# Patient Record
Sex: FEMALE | Race: WHITE | NOT HISPANIC OR LATINO | Employment: FULL TIME | URBAN - METROPOLITAN AREA
[De-identification: names, ages, dates, MRNs, and addresses within clinical notes are randomized per-mention and may not be internally consistent; named-entity substitution may affect disease eponyms.]

---

## 2017-06-19 ENCOUNTER — OFFICE VISIT (OUTPATIENT)
Dept: OBSTETRICS AND GYNECOLOGY | Facility: CLINIC | Age: 27
End: 2017-06-19
Payer: COMMERCIAL

## 2017-06-19 VITALS
HEIGHT: 66 IN | BODY MASS INDEX: 26.93 KG/M2 | SYSTOLIC BLOOD PRESSURE: 109 MMHG | DIASTOLIC BLOOD PRESSURE: 72 MMHG | WEIGHT: 167.56 LBS

## 2017-06-19 DIAGNOSIS — Z30.011 ENCOUNTER FOR INITIAL PRESCRIPTION OF CONTRACEPTIVE PILLS: ICD-10-CM

## 2017-06-19 DIAGNOSIS — Z01.419 ROUTINE GYNECOLOGICAL EXAMINATION: Primary | ICD-10-CM

## 2017-06-19 PROCEDURE — 99999 PR PBB SHADOW E&M-EST. PATIENT-LVL III: CPT | Mod: PBBFAC,,, | Performed by: ADVANCED PRACTICE MIDWIFE

## 2017-06-19 PROCEDURE — 87591 N.GONORRHOEAE DNA AMP PROB: CPT

## 2017-06-19 PROCEDURE — 88175 CYTOPATH C/V AUTO FLUID REDO: CPT

## 2017-06-19 PROCEDURE — 99395 PREV VISIT EST AGE 18-39: CPT | Mod: S$GLB,,, | Performed by: ADVANCED PRACTICE MIDWIFE

## 2017-06-19 RX ORDER — NORETHINDRONE ACETATE AND ETHINYL ESTRADIOL .03; 1.5 MG/1; MG/1
1 TABLET ORAL DAILY
Qty: 30 EACH | Refills: 12 | Status: SHIPPED | OUTPATIENT
Start: 2017-06-19 | End: 2018-04-03 | Stop reason: SDUPTHER

## 2017-06-19 NOTE — PROGRESS NOTES
Subjective:       Beverley Gonzalez is a 27 y.o. female here for a routine exam.  Current complaints: none.  Personal health questionnaire reviewed: no.     Gynecologic History  Patient's last menstrual period was 2017.  Contraception: OCP (estrogen/progesterone)  Last Pap: . Results were: normal  Last mammogram: n/a.     Obstetric History  OB History    Para Term  AB Living   0 0 0 0 0 0   SAB TAB Ectopic Multiple Live Births   0 0 0 0                The following portions of the patient's history were reviewed and updated as appropriate: allergies, current medications, past family history, past medical history, past social history, past surgical history and problem list.    Review of Systems  Pertinent items are noted in HPI.      Objective:           General Appearance:    Alert, cooperative, no distress, appears stated age   Head:    Normocephalic, without obvious abnormality, atraumatic   Lungs:     Clear to auscultation bilaterally, respirations unlabored    Heart:    Regular rate and rhythm, S1 and S2 normal, no murmur   Breast Exam:    Skin soft and clear, no puckering noted. Nipples centrally placed without discharge or pain. No masses palpated in breast tissue or axilla. Educated on self breast exams.   Abdomen:     Soft, non-tender, bowel sounds active all four quadrants,     no masses, no organomegaly   Genitalia:   Vulva normal in appearance, no rash or lesions noted. Vagina moist, with adequate ruggae, pink in appearance, no tenderness or lesions noted, no discharge noted. Cervix closed, pink, without bleeding or discharge. Specimens collected.  Bimanual exam: no CMT, uterus palpated approx 4 wk size, ovaries not palpated. Good vaginal tone noted. Rectal exam deferred.     Extremities:   Extremities normal, atraumatic, no cyanosis or edema   Pulses:   2+ and symmetric all extremities   Skin:   Skin color, texture, turgor normal, no rashes or lesions         Assessment:       Healthy female exam.      Plan:      Education reviewed: safe sex/STD prevention and self breast exams.  Contraception: OCP (estrogen/progesterone).  Follow up in: 1 year.

## 2017-06-21 LAB
C TRACH DNA SPEC QL NAA+PROBE: NOT DETECTED
N GONORRHOEA DNA SPEC QL NAA+PROBE: NOT DETECTED

## 2017-06-23 ENCOUNTER — PATIENT MESSAGE (OUTPATIENT)
Dept: OBSTETRICS AND GYNECOLOGY | Facility: CLINIC | Age: 27
End: 2017-06-23

## 2018-04-03 DIAGNOSIS — Z30.011 ENCOUNTER FOR INITIAL PRESCRIPTION OF CONTRACEPTIVE PILLS: ICD-10-CM

## 2018-04-03 RX ORDER — NORETHINDRONE ACETATE AND ETHINYL ESTRADIOL .03; 1.5 MG/1; MG/1
1 TABLET ORAL DAILY
Qty: 30 EACH | Refills: 3 | Status: SHIPPED | OUTPATIENT
Start: 2018-04-03 | End: 2018-04-16 | Stop reason: SDUPTHER

## 2018-04-03 NOTE — TELEPHONE ENCOUNTER
Hi. Yes, I am out of birth control. I had a mail order delivery get lost in the mail and I think when I called to reorder, the refill counted against me. Either way, I am running out by mid-April, thus I either need the refill or an appt asap.    ----- Message -----   From: Vimal Carson   Sent: 3/28/2018  8:21 AM CDT   To: Beverley Gonzalez   Subject: RE: Appointment Request   Your annual is not due until after 6/20/18 so I have you scheduled for 6/21/18 @ 8 am with Dr De Leon. Are you out of birth control ? There was enough refills to last a year.      ----- Message -----      From: Beverley Gonzalez      Sent: 3/28/2018  7:51 AM CDT        To: Patient Appointment Schedule Request Mailing List   Subject: RE: Appointment Request      Please set me up with another doctor then. I still need an appointment and to renew my birth control.    ThanksBeverley.   ----- Message -----   From: Vimal Carson   Sent: 3/27/2018  7:40 AM CDT   To: Beverley Gonzalez   Subject: RE: Appointment Request   The midwives are not seeing any GYN patient's only OB's so unfortunately this appointment will need to be scheduled with one of the Doctor's or NP's.      ----- Message -----      From: Beverley Gonzalez      Sent: 3/26/2018 10:42 PM CDT        To: Patient Appointment Schedule Request Mailing List   Subject: Appointment Request      Appointment Request From: Beverley Gonzalez      With Provider: Bing Mccollum CNM [[facility]]      Would Accept With:Only the person I've selected      Preferred Date Range: From 4/10/2018 To 4/18/2018      Preferred Times: Any      Reason for visit: Request an Appt      Comments:   Looking to book my annual check up.   Prefer a late afternoon appointment.

## 2018-04-16 DIAGNOSIS — Z30.011 ENCOUNTER FOR INITIAL PRESCRIPTION OF CONTRACEPTIVE PILLS: ICD-10-CM

## 2018-04-17 RX ORDER — NORETHINDRONE ACETATE AND ETHINYL ESTRADIOL .03; 1.5 MG/1; MG/1
1 TABLET ORAL DAILY
Qty: 90 EACH | Refills: 0 | Status: SHIPPED | OUTPATIENT
Start: 2018-04-17 | End: 2018-06-21 | Stop reason: SDUPTHER

## 2018-06-20 ENCOUNTER — PATIENT MESSAGE (OUTPATIENT)
Dept: OBSTETRICS AND GYNECOLOGY | Facility: CLINIC | Age: 28
End: 2018-06-20

## 2018-06-21 ENCOUNTER — OFFICE VISIT (OUTPATIENT)
Dept: OBSTETRICS AND GYNECOLOGY | Facility: CLINIC | Age: 28
End: 2018-06-21
Payer: COMMERCIAL

## 2018-06-21 VITALS
DIASTOLIC BLOOD PRESSURE: 64 MMHG | HEIGHT: 66 IN | BODY MASS INDEX: 26.8 KG/M2 | WEIGHT: 166.75 LBS | SYSTOLIC BLOOD PRESSURE: 110 MMHG

## 2018-06-21 DIAGNOSIS — Z01.419 ENCOUNTER FOR GYNECOLOGICAL EXAMINATION WITHOUT ABNORMAL FINDING: Primary | ICD-10-CM

## 2018-06-21 DIAGNOSIS — Z30.41 ENCOUNTER FOR SURVEILLANCE OF CONTRACEPTIVE PILLS: ICD-10-CM

## 2018-06-21 PROCEDURE — 99395 PREV VISIT EST AGE 18-39: CPT | Mod: S$GLB,,, | Performed by: NURSE PRACTITIONER

## 2018-06-21 PROCEDURE — 88175 CYTOPATH C/V AUTO FLUID REDO: CPT

## 2018-06-21 PROCEDURE — 99999 PR PBB SHADOW E&M-EST. PATIENT-LVL III: CPT | Mod: PBBFAC,,, | Performed by: NURSE PRACTITIONER

## 2018-06-21 RX ORDER — NORETHINDRONE ACETATE AND ETHINYL ESTRADIOL .03; 1.5 MG/1; MG/1
1 TABLET ORAL DAILY
Qty: 90 EACH | Refills: 4 | Status: SHIPPED | OUTPATIENT
Start: 2018-06-21 | End: 2019-06-17 | Stop reason: SDUPTHER

## 2018-06-21 NOTE — PROGRESS NOTES
"CC: Well woman exam    Beverley Gonzalez is a 28 y.o. female  presents for well woman exam.  LMP: Patient's last menstrual period was 2018 (approximate)..  No issues, problems, or complaints.      Past Medical History:   Diagnosis Date    Abnormal Pap smear of vagina     repeat pap normal     History reviewed. No pertinent surgical history.  Social History     Social History    Marital status: Single     Spouse name: N/A    Number of children: N/A    Years of education: N/A     Occupational History    Not on file.     Social History Main Topics    Smoking status: Never Smoker    Smokeless tobacco: Never Used    Alcohol use Yes      Comment: socially    Drug use: No    Sexual activity: Yes     Partners: Male     Birth control/ protection: OCP     Other Topics Concern    Not on file     Social History Narrative    No narrative on file     Family History   Problem Relation Age of Onset    Cancer Paternal Grandmother     Alzheimer's disease Maternal Grandfather     No Known Problems Father     No Known Problems Mother     No Known Problems Sister     Breast cancer Neg Hx     Colon cancer Neg Hx     Ovarian cancer Neg Hx      OB History      Para Term  AB Living    0 0 0 0 0 0    SAB TAB Ectopic Multiple Live Births    0 0 0 0            /64   Ht 5' 6" (1.676 m)   Wt 75.7 kg (166 lb 12.5 oz)   LMP 2018 (Approximate)   BMI 26.92 kg/m²       ROS:  GENERAL: Denies weight gain or weight loss. Feeling well overall.   SKIN: Denies rash or lesions.   HEAD: Denies head injury or headache.   NODES: Denies enlarged lymph nodes.   CHEST: Denies chest pain or shortness of breath.   CARDIOVASCULAR: Denies palpitations or left sided chest pain.   ABDOMEN: No abdominal pain, constipation, diarrhea, nausea, vomiting or rectal bleeding.   URINARY: No frequency, dysuria, hematuria, or burning on urination.  REPRODUCTIVE: See HPI.   BREASTS: The patient performs breast " self-examination and denies pain, lumps, or nipple discharge.   HEMATOLOGIC: No easy bruisability or excessive bleeding.   MUSCULOSKELETAL: Denies joint pain or swelling.   NEUROLOGIC: Denies syncope or weakness.   PSYCHIATRIC: Denies depression, anxiety or mood swings.    PHYSICAL EXAM:  APPEARANCE: Well nourished, well developed, in no acute distress.  AFFECT: WNL, alert and oriented x 3  SKIN: No acne or hirsutism  NECK: Neck symmetric without masses or thyromegaly  NODES: No inguinal, cervical, axillary, or femoral lymph node enlargement  CHEST: Good respiratory effect  ABDOMEN: Soft.  No tenderness or masses.  No hepatosplenomegaly.  No hernias.  BREASTS: Symmetrical, no skin changes or visible lesions.  No palpable masses, nipple discharge bilaterally.  PELVIC: Normal external genitalia without lesions.  Normal hair distribution.  Adequate perineal body, normal urethral meatus.  Vagina moist and well rugated without lesions or discharge.  Cervix pink - mucous at endocervix pt ending her cycle , without lesions, discharge or tenderness.  No significant cystocele or rectocele.  Bimanual exam shows uterus to be normal size, regular, mobile and nontender.  Adnexa without masses or tenderness.    EXTREMITIES: No edema.  Physical Exam    1. Encounter for gynecological examination without abnormal finding  Liquid-based pap smear, screening   2. Encounter for surveillance of contraceptive pills  norethindrone ac-eth estradiol (LOESTRIN 1.5/30, 21,) 1.5-30 mg-mcg Tab    AND PLAN:    Patient was counseled today on A.C.S. Pap guidelines and recommendations for yearly pelvic exams, mammograms and monthly self breast exams; to see her PCP for other health maintenance.

## 2018-06-21 NOTE — PATIENT INSTRUCTIONS
The Range of Pap Test Results  When your Pap test is sent to the lab, the lab studies your cell samples and reports any abnormal cell changes. Your health care provider can discuss these changes with you. In some cases, an abnormal Pap test is due to an infection. More serious cell changes range from dysplasia to cancer. Talk to your health care provider about your Pap test.    Normal results  Cervical cells, even normal ones, are always changing. As they mature, normal squamous cells move from deeper layers within the cervix. Over time, these cells flatten and cover the surface of the cervix. Within the cervical canal, the cells are different. These glandular cells are taller and not as flat as the cells on the surface of the cervix. When a Pap test sample shows healthy cells of both types, the results are negative. Keep having Pap tests as often as directed.  Abnormal results  A positive Pap test result means some cells in the sample showed abnormal changes. These results are grouped by the type of cell change and the location, or extent, of the changes. Depending on the results, you may need further testing.  · Inflammation: Noncancerous changes are present. They may be due to normal cell repair. Or, they may be caused by an infection, such as HPV or yeast. Further testing may be needed. (Also called reactive cellular changes.)  · Atypical squamous cells: Test results are unclear. Cells on the surface of the cervix show changes, but their significance is not yet known. Testing for HPV and other sexually transmitted infections (STIs) may be needed. Treatment may be required. (Reported as ASC-US or ASC-H.)  · Atypical glandular cells: Cells lining the cervical canal show abnormal changes. Further testing is likely. You may also have treatment to destroy or remove problem cells. (Reported as AGC.)  · Mild dysplasia: Cells show distinct changes. More testing or HPV typing may be done. You may also have treatment to  destroy or remove problem cells. (Reported as low-grade LUIS CARLOS or SANFORD 1.)  · Moderate to severe dysplasia: Cells show precancerous changes. Or, noninvasive cancer (carcinoma in situ) may be present. Treatment to destroy or remove problem cells is likely. (Reported as high-grade LUIS CARLOS or SANFORD 2 or SANFORD 3.)  · Cancer: Different types of cancer may be detected by your Pap test. More tests to assess the cancer's extent are likely. The type of treatment will depend on the test results and other factors, such as age and health history. (Reported as squamous cell carcinoma, endocervical adenocarcinoma in situ, or adenocarcinoma.)  Date Last Reviewed: 5/12/2015  © 3267-7975 [employer]. [address] [place] [address], [place] [address]. All rights reserved. This information is not intended as a substitute for professional medical care. Always follow your healthcare professional's instructions.        Birth Control: The Pill    Birth control pills contain hormones that help prevent pregnancy. The pills are prescribed by your healthcare provider. There are many types of birth control pills available. If you have side effects from one type of pill, tell your healthcare provider. He or she may be able to prescribe a pill that works better for you.  Pregnancy rates  Talk to your healthcare provider about the effectiveness of this birth control method.  Using the pill  · Take one pill daily. Take it at around the same time each day.  · Follow your healthcare providers guidelines on when to start your first pack of pills. You may need to use another form of birth control for a week or more after you start.  · Know what to do if you forget to take a pill. (Consult your healthcare provider or check the package.) If you miss more than one pill, you may need to use a backup method of birth control for a week or more.  Pros  · Low pregnancy rate  · No interruption to sex  · Easy to use  · Can help make periods more regular  · May lower  your risk of ovarian cysts and certain cancers  · May decrease menstrual cramps, menstrual flow, and acne  Cons  · Does not protect against sexually transmitted infection (STIs)  · Requires taking a pill on time each day  · May not work as well when taken with certain other medicines (check with your pharmacist)  · May cause side effects such as nausea, irregular bleeding, headaches, breast tenderness, fatigue, or mood changes (these often go away within 3 months)  · May increase the risk of blood clots, heart attack, and stroke  The pill may not be for you  The pill may not be for you if:  · You are a smoker and over age 35  · You have high blood pressure or gallbladder, liver, cerebrovascular  or heart disease  · You have diabetes, migraines, blood clot in the vein or artery, lupus, depression, certain lipid disorders, or take medicines that interfere with the pill  In these cases, discuss the risks with your healthcare provider.  Date Last Reviewed: 3/1/2017  © 2330-9462 [employer]. [address] [place] [address], [place] [address]. All rights reserved. This information is not intended as a substitute for professional medical care. Always follow your healthcare professional's instructions.

## 2018-06-26 ENCOUNTER — PATIENT MESSAGE (OUTPATIENT)
Dept: OBSTETRICS AND GYNECOLOGY | Facility: CLINIC | Age: 28
End: 2018-06-26

## 2019-06-17 DIAGNOSIS — Z30.41 ENCOUNTER FOR SURVEILLANCE OF CONTRACEPTIVE PILLS: ICD-10-CM

## 2019-06-17 RX ORDER — NORETHINDRONE ACETATE AND ETHINYL ESTRADIOL .03; 1.5 MG/1; MG/1
1 TABLET ORAL DAILY
Qty: 90 EACH | Refills: 4 | Status: SHIPPED | OUTPATIENT
Start: 2019-06-17 | End: 2019-07-10 | Stop reason: SDUPTHER

## 2019-07-10 ENCOUNTER — OFFICE VISIT (OUTPATIENT)
Dept: OBSTETRICS AND GYNECOLOGY | Facility: CLINIC | Age: 29
End: 2019-07-10
Payer: COMMERCIAL

## 2019-07-10 VITALS
BODY MASS INDEX: 27 KG/M2 | HEIGHT: 66 IN | SYSTOLIC BLOOD PRESSURE: 106 MMHG | WEIGHT: 168 LBS | DIASTOLIC BLOOD PRESSURE: 78 MMHG

## 2019-07-10 DIAGNOSIS — B37.31 YEAST VAGINITIS: ICD-10-CM

## 2019-07-10 DIAGNOSIS — Z01.419 ENCOUNTER FOR GYNECOLOGICAL EXAMINATION WITHOUT ABNORMAL FINDING: Primary | ICD-10-CM

## 2019-07-10 DIAGNOSIS — Z30.41 ENCOUNTER FOR SURVEILLANCE OF CONTRACEPTIVE PILLS: ICD-10-CM

## 2019-07-10 PROCEDURE — 99999 PR PBB SHADOW E&M-EST. PATIENT-LVL III: ICD-10-PCS | Mod: PBBFAC,,, | Performed by: NURSE PRACTITIONER

## 2019-07-10 PROCEDURE — 88175 CYTOPATH C/V AUTO FLUID REDO: CPT

## 2019-07-10 PROCEDURE — 99395 PR PREVENTIVE VISIT,EST,18-39: ICD-10-PCS | Mod: S$GLB,,, | Performed by: NURSE PRACTITIONER

## 2019-07-10 PROCEDURE — 99999 PR PBB SHADOW E&M-EST. PATIENT-LVL III: CPT | Mod: PBBFAC,,, | Performed by: NURSE PRACTITIONER

## 2019-07-10 PROCEDURE — 99395 PREV VISIT EST AGE 18-39: CPT | Mod: S$GLB,,, | Performed by: NURSE PRACTITIONER

## 2019-07-10 RX ORDER — NORETHINDRONE ACETATE AND ETHINYL ESTRADIOL .03; 1.5 MG/1; MG/1
1 TABLET ORAL DAILY
Qty: 90 EACH | Refills: 4 | Status: SHIPPED | OUTPATIENT
Start: 2019-07-10 | End: 2022-07-21

## 2019-07-10 RX ORDER — FLUCONAZOLE 150 MG/1
TABLET ORAL
Qty: 2 TABLET | Refills: 1 | Status: SHIPPED | OUTPATIENT
Start: 2019-07-10 | End: 2022-07-21

## 2019-07-10 NOTE — PATIENT INSTRUCTIONS
The Range of Pap Test Results  When your Pap test is sent to the lab, the lab studies your cell samples and reports any abnormal cell changes. Your health care provider can discuss these changes with you. In some cases, an abnormal Pap test is due to an infection. More serious cell changes range from dysplasia to cancer. Talk to your health care provider about your Pap test.    Normal results  Cervical cells, even normal ones, are always changing. As they mature, normal squamous cells move from deeper layers within the cervix. Over time, these cells flatten and cover the surface of the cervix. Within the cervical canal, the cells are different. These glandular cells are taller and not as flat as the cells on the surface of the cervix. When a Pap test sample shows healthy cells of both types, the results are negative. Keep having Pap tests as often as directed.  Abnormal results  A positive Pap test result means some cells in the sample showed abnormal changes. These results are grouped by the type of cell change and the location, or extent, of the changes. Depending on the results, you may need further testing.  · Inflammation: Noncancerous changes are present. They may be due to normal cell repair. Or, they may be caused by an infection, such as HPV or yeast. Further testing may be needed. (Also called reactive cellular changes.)  · Atypical squamous cells: Test results are unclear. Cells on the surface of the cervix show changes, but their significance is not yet known. Testing for HPV and other sexually transmitted infections (STIs) may be needed. Treatment may be required. (Reported as ASC-US or ASC-H.)  · Atypical glandular cells: Cells lining the cervical canal show abnormal changes. Further testing is likely. You may also have treatment to destroy or remove problem cells. (Reported as AGC.)  · Mild dysplasia: Cells show distinct changes. More testing or HPV typing may be done. You may also have treatment to  destroy or remove problem cells. (Reported as low-grade LUIS CARLOS or SANFORD 1.)  · Moderate to severe dysplasia: Cells show precancerous changes. Or, noninvasive cancer (carcinoma in situ) may be present. Treatment to destroy or remove problem cells is likely. (Reported as high-grade LUIS CARLOS or SANFORD 2 or SANFORD 3.)  · Cancer: Different types of cancer may be detected by your Pap test. More tests to assess the cancer's extent are likely. The type of treatment will depend on the test results and other factors, such as age and health history. (Reported as squamous cell carcinoma, endocervical adenocarcinoma in situ, or adenocarcinoma.)  Date Last Reviewed: 5/12/2015  © 6715-9880 [employer]. [address] [place] [address], [place] [address]. All rights reserved. This information is not intended as a substitute for professional medical care. Always follow your healthcare professional's instructions.        Breast Health: Breast Self-Awareness  What is breast self-awareness?  Breast self-awareness is knowing how your breasts normally look and feel. Your breasts change as you go through different stages of your life. So its important to learn what is normal for your breasts. Breast self-awareness helps you notice any changes in your breasts right away. Report any changes to your healthcare provider.  Why is breast self-awareness important?  Many experts now say that women should focus on breast self-awareness instead of doing a breast self-examination (BSE). These experts include the American Cancer Society, the U.S. Preventive Services Task Force, and the American Congress of Obstetricians and Gynecologists. Some experts even advise not teaching women to do a BSE. Thats because research hasnt shown a clear benefit to doing BSEs.  Breast self-awareness is different than a BSE. Breast self-awareness isnt about following a certain method and schedule. Its about knowing what's normal for your breasts. That way you can notice even  small changes right away. If you see any changes, report them to your healthcare provider.  Changes to look for  Call your healthcare provider if you find any changes in your breasts that concern you. These changes may include:  · A lump  · Nipple discharge other than breast milk, especially a bloody discharge  · Swelling  · A change in size or shape  · Skin irritation, such as redness, thickening, or dimpling of the skin  · Swollen lymph nodes in the armpit  · Nipple problems, such as pain or redness  If you find a lump  Contact your provider if you find lumpiness in one breast, feel something different in the tissue, or feel a definite lump. Sometimes lumpiness may be due to menstrual changes. But there may be reason for concern.  Your provider may want to see you right away if you have:  · Nipple discharge that is bloody  · Skin changes on your breast, such as dimpling or puckering  Its normal to be upset if you find a lump. But its important to contact your provider right away. Remember that most breast lumps are benign. This means they are not cancer.  Date Last Reviewed: 8/10/2015  © 4500-6856 [employer]. [address] [place] [address], [place] [address]. All rights reserved. This information is not intended as a substitute for professional medical care. Always follow your healthcare professional's instructions.

## 2019-07-10 NOTE — PROGRESS NOTES
CC: Well woman exam    Beverley Gonzalez is a 29 y.o. female  presents for well woman exam.  LMP: Patient's last menstrual period was 06/10/2019..  No issues, problems, or complaints.      Past Medical History:   Diagnosis Date    Abnormal Pap smear of vagina     repeat pap normal     History reviewed. No pertinent surgical history.  Social History     Socioeconomic History    Marital status: Single     Spouse name: Not on file    Number of children: Not on file    Years of education: Not on file    Highest education level: Not on file   Occupational History    Not on file   Social Needs    Financial resource strain: Not on file    Food insecurity:     Worry: Not on file     Inability: Not on file    Transportation needs:     Medical: Not on file     Non-medical: Not on file   Tobacco Use    Smoking status: Never Smoker    Smokeless tobacco: Never Used   Substance and Sexual Activity    Alcohol use: Yes     Comment: socially    Drug use: No    Sexual activity: Yes     Partners: Male     Birth control/protection: OCP   Lifestyle    Physical activity:     Days per week: Not on file     Minutes per session: Not on file    Stress: Not on file   Relationships    Social connections:     Talks on phone: Not on file     Gets together: Not on file     Attends Anabaptism service: Not on file     Active member of club or organization: Not on file     Attends meetings of clubs or organizations: Not on file     Relationship status: Not on file   Other Topics Concern    Not on file   Social History Narrative    Not on file     Family History   Problem Relation Age of Onset    Cancer Paternal Grandmother     Alzheimer's disease Maternal Grandfather     No Known Problems Father     No Known Problems Mother     No Known Problems Sister     Breast cancer Neg Hx     Colon cancer Neg Hx     Ovarian cancer Neg Hx      OB History        0    Para   0    Term   0       0    AB   0    Living  "  0       SAB   0    TAB   0    Ectopic   0    Multiple   0    Live Births                     /78   Ht 5' 6" (1.676 m)   Wt 76.2 kg (167 lb 15.9 oz)   LMP 06/10/2019   BMI 27.11 kg/m²       ROS:  GENERAL: Denies weight gain or weight loss. Feeling well overall.   SKIN: Denies rash or lesions.   HEAD: Denies head injury or headache.   NODES: Denies enlarged lymph nodes.   CHEST: Denies chest pain or shortness of breath.   CARDIOVASCULAR: Denies palpitations or left sided chest pain.   ABDOMEN: No abdominal pain, constipation, diarrhea, nausea, vomiting or rectal bleeding.   URINARY: No frequency, dysuria, hematuria, or burning on urination.  REPRODUCTIVE: See HPI.   BREASTS: The patient performs breast self-examination and denies pain, lumps, or nipple discharge.   HEMATOLOGIC: No easy bruisability or excessive bleeding.   MUSCULOSKELETAL: Denies joint pain or swelling.   NEUROLOGIC: Denies syncope or weakness.   PSYCHIATRIC: Denies depression, anxiety or mood swings.    PHYSICAL EXAM:  APPEARANCE: Well nourished, well developed, in no acute distress.  AFFECT: WNL, alert and oriented x 3  SKIN: No acne or hirsutism  NECK: Neck symmetric without masses or thyromegaly  NODES: No inguinal, cervical, axillary, or femoral lymph node enlargement  CHEST: Good respiratory effect  ABDOMEN: Soft.  No tenderness or masses.  No hepatosplenomegaly.  No hernias.  BREASTS: Symmetrical, no skin changes or visible lesions.  No palpable masses, nipple discharge bilaterally.  PELVIC: Normal external genitalia without lesions.  Normal hair distribution.  Adequate perineal body, normal urethral meatus.  Vagina moist and well rugated without lesions, thick clumpy yellowish yeast appearing discharge - pt did not know and has no complaints.  Cervix pink, without lesions, discharge or tenderness.  No significant cystocele or rectocele.  Bimanual exam shows uterus to be normal size, regular, mobile and nontender.  Adnexa without " masses or tenderness.    EXTREMITIES: No edema.  Physical Exam    1. Encounter for gynecological examination without abnormal finding  Liquid-based pap smear, screening   2. Encounter for surveillance of contraceptive pills  norethindrone ac-eth estradiol (LOESTRIN 1.5/30, 21,) 1.5-30 mg-mcg Tab    AND PLAN:    Patient was counseled today on A.C.S. Pap guidelines and recommendations for yearly pelvic exams, mammograms and monthly self breast exams; to see her PCP for other health maintenance.

## 2019-07-19 ENCOUNTER — PATIENT MESSAGE (OUTPATIENT)
Dept: OBSTETRICS AND GYNECOLOGY | Facility: CLINIC | Age: 29
End: 2019-07-19